# Patient Record
Sex: FEMALE | Employment: STUDENT | URBAN - METROPOLITAN AREA
[De-identification: names, ages, dates, MRNs, and addresses within clinical notes are randomized per-mention and may not be internally consistent; named-entity substitution may affect disease eponyms.]

---
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
## 2022-05-27 ENCOUNTER — EMPLOYEE HEALTH (OUTPATIENT)
Dept: OTHER | Age: 22
End: 2022-05-27

## 2022-05-27 DIAGNOSIS — Z02.1 PRE-EMPLOYMENT HEALTH SCREENING EXAMINATION: Primary | ICD-10-CM

## 2022-05-31 ENCOUNTER — LAB SERVICES (OUTPATIENT)
Dept: LAB | Age: 22
End: 2022-05-31

## 2022-05-31 DIAGNOSIS — Z02.1 PRE-EMPLOYMENT HEALTH SCREENING EXAMINATION: ICD-10-CM

## 2022-05-31 PROCEDURE — 36415 COLL VENOUS BLD VENIPUNCTURE: CPT | Performed by: INTERNAL MEDICINE

## 2022-05-31 PROCEDURE — 86480 TB TEST CELL IMMUN MEASURE: CPT | Performed by: INTERNAL MEDICINE

## 2022-06-02 LAB
GAMMA INTERFERON BACKGROUND BLD IA-ACNC: 0.03 IU/ML
M TB IFN-G BLD-IMP: NEGATIVE
M TB IFN-G CD4+ BCKGRND COR BLD-ACNC: 0 IU/ML
M TB IFN-G CD4+CD8+ BCKGRND COR BLD-ACNC: 0 IU/ML
MITOGEN IGNF BCKGRD COR BLD-ACNC: 7.25 IU/ML

## 2025-06-18 NOTE — PROGRESS NOTES
CHIEF COMPLAINT:     Chief Complaint   Patient presents with    Sore Throat     Sore throat started yesterday, and head pain        HPI:   Sarika Hickman is a 24 year old female presents to clinic with complaint of sore throat onset yesterday.  Reports sore throat, mild HA, PND, fatigue.  Denies fever, cough, body aches, chills, dyspnea, wheezing, sob, chest pain, GI complaints, ear pain, or rashes.  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  Taking dayquil, ibuprofen.  Denies recurrent hx of strep.  Tolerating po.    Current Medications[1]   Past Medical History[2]   Social History:  Short Social Hx on File[3]     REVIEW OF SYSTEMS:   GENERAL HEALTH: See HPI  SKIN: denies any unusual skin lesions or rashes  HEENT: denies ear pain or difficulty swallowing/eating. See HPI  RESPIRATORY: denies shortness of breath or wheezing  CARDIOVASCULAR: denies chest pain or palpitations   GI: denies vomiting or diarrhea  NEURO: denies dizziness or lightheadedness    EXAM:   /79 (BP Location: Left arm, Patient Position: Sitting, Cuff Size: adult)   Pulse 65   Temp 98.4 °F (36.9 °C) (Oral)   Resp 18   Ht 5' 4\" (1.626 m)   Wt 200 lb (90.7 kg)   LMP 06/12/2025   SpO2 100%   BMI 34.33 kg/m²   GENERAL: Well-appearing, well developed, well nourished, in no apparent distress  SKIN: no rashes, no suspicious lesions  HEAD: atraumatic, normocephalic  EYES: conjunctiva clear, EOM intact  EARS: TM's clear, non-injected, no bulging, retraction, or fluid bilaterally  NOSE: nostrils patent, no exudates, nasal mucosa pink and noninflamed  THROAT: oral mucosa pink, moist. Posterior pharynx +mildly erythematous. No exudates.   NECK: supple, non-tender  LUNGS: clear to auscultation bilaterally, no wheezes or rhonchi. Breathing is non labored. No cough.  CARDIO: RRR without murmur  LYMPH: No lymphadenopathy.    Recent Results (from the past 24 hours)   Rapid Strep    Collection Time: 06/18/25  5:33 PM    Result Value Ref Range    Strep Grp A Screen Neg Negative    Control Line Present with a clear background (yes/no) yes Yes/No    Kit Lot # [id number] Numeric    Kit Expiration Date 6/4/2026 Date   Rapid Covid-19    Collection Time: 06/18/25  6:01 PM    Specimen: Nares   Result Value Ref Range    Rapid SARS-CoV-2 by PCR Not Detected Not Detected    POCT Lot Number [id number]     POCT Expiration Date 09/27/2026     POCT Procedure Control Control Valid Control Valid     ,041          ASSESSMENT AND PLAN:   Assessment:   Sarika was seen today for sore throat.    Diagnoses and all orders for this visit:    Viral pharyngitis  -     Rapid Strep  -     Rapid Covid-19          Plan:   - Negative rapid strep  - Negative rapid covid  - Hx/exam c/w early viral illness.  - Comfort/otc measures explained and discussed as listed in Patient Instructions.  - Advised follow up within 5-7 days if not improving, condition worsens, or new/persistent fevers.  - Pt verbalizes understanding and is agreeable w/ plan.    There are no Patient Instructions on file for this visit.                  [1]   Current Outpatient Medications   Medication Sig Dispense Refill    MetFORMIN HCl  MG Oral Tablet 24 Hr       Fexofenadine HCl 180 MG Oral Tab Take 180 mg by mouth daily.      ibuprofen 400 MG Oral Tab Take 400 mg by mouth every 6 (six) hours as needed for Pain.      Meloxicam 15 MG Oral Tab Take 1 tablet (15 mg total) by mouth daily. 30 tablet 1   [2] History reviewed. No pertinent past medical history.  [3]   Social History  Socioeconomic History    Marital status: Single   Tobacco Use    Smoking status: Never    Smokeless tobacco: Never   Substance and Sexual Activity    Alcohol use: No    Drug use: No     Social Drivers of Health     Stress: No Stress Concern Present (8/4/2020)    Received from [facility]    [facility] - Occupational Stress Questionnaire     Feeling of Stress  : Not at all